# Patient Record
Sex: FEMALE | Race: ASIAN | NOT HISPANIC OR LATINO
[De-identification: names, ages, dates, MRNs, and addresses within clinical notes are randomized per-mention and may not be internally consistent; named-entity substitution may affect disease eponyms.]

---

## 2017-07-08 ENCOUNTER — RX RENEWAL (OUTPATIENT)
Age: 72
End: 2017-07-08

## 2021-03-18 ENCOUNTER — EMERGENCY (EMERGENCY)
Facility: HOSPITAL | Age: 76
LOS: 1 days | Discharge: HOME | End: 2021-03-18
Attending: EMERGENCY MEDICINE | Admitting: EMERGENCY MEDICINE
Payer: MEDICARE

## 2021-03-18 VITALS
HEART RATE: 62 BPM | SYSTOLIC BLOOD PRESSURE: 184 MMHG | TEMPERATURE: 98 F | OXYGEN SATURATION: 100 % | DIASTOLIC BLOOD PRESSURE: 53 MMHG | RESPIRATION RATE: 16 BRPM

## 2021-03-18 VITALS
RESPIRATION RATE: 18 BRPM | TEMPERATURE: 98 F | SYSTOLIC BLOOD PRESSURE: 126 MMHG | OXYGEN SATURATION: 100 % | HEART RATE: 61 BPM | DIASTOLIC BLOOD PRESSURE: 95 MMHG

## 2021-03-18 PROCEDURE — 71045 X-RAY EXAM CHEST 1 VIEW: CPT | Mod: 26

## 2021-03-18 PROCEDURE — 73060 X-RAY EXAM OF HUMERUS: CPT | Mod: 26,RT

## 2021-03-18 PROCEDURE — 73090 X-RAY EXAM OF FOREARM: CPT | Mod: 26,RT

## 2021-03-18 PROCEDURE — 70486 CT MAXILLOFACIAL W/O DYE: CPT | Mod: 26

## 2021-03-18 PROCEDURE — 93010 ELECTROCARDIOGRAM REPORT: CPT

## 2021-03-18 PROCEDURE — 73110 X-RAY EXAM OF WRIST: CPT | Mod: 26,RT

## 2021-03-18 PROCEDURE — 73080 X-RAY EXAM OF ELBOW: CPT | Mod: 26,RT

## 2021-03-18 PROCEDURE — 70450 CT HEAD/BRAIN W/O DYE: CPT | Mod: 26

## 2021-03-18 PROCEDURE — 99285 EMERGENCY DEPT VISIT HI MDM: CPT | Mod: 25

## 2021-03-18 PROCEDURE — 73030 X-RAY EXAM OF SHOULDER: CPT | Mod: 26,RT

## 2021-03-18 RX ORDER — LIDOCAINE 4 G/100G
1 CREAM TOPICAL ONCE
Refills: 0 | Status: COMPLETED | OUTPATIENT
Start: 2021-03-18 | End: 2021-03-18

## 2021-03-18 RX ORDER — TETANUS TOXOID, REDUCED DIPHTHERIA TOXOID AND ACELLULAR PERTUSSIS VACCINE, ADSORBED 5; 2.5; 8; 8; 2.5 [IU]/.5ML; [IU]/.5ML; UG/.5ML; UG/.5ML; UG/.5ML
0.5 SUSPENSION INTRAMUSCULAR ONCE
Refills: 0 | Status: COMPLETED | OUTPATIENT
Start: 2021-03-18 | End: 2021-03-18

## 2021-03-18 RX ORDER — ACETAMINOPHEN 500 MG
975 TABLET ORAL ONCE
Refills: 0 | Status: COMPLETED | OUTPATIENT
Start: 2021-03-18 | End: 2021-03-18

## 2021-03-18 RX ADMIN — TETANUS TOXOID, REDUCED DIPHTHERIA TOXOID AND ACELLULAR PERTUSSIS VACCINE, ADSORBED 0.5 MILLILITER(S): 5; 2.5; 8; 8; 2.5 SUSPENSION INTRAMUSCULAR at 12:05

## 2021-03-18 RX ADMIN — LIDOCAINE 1 PATCH: 4 CREAM TOPICAL at 12:23

## 2021-03-18 RX ADMIN — Medication 975 MILLIGRAM(S): at 12:23

## 2021-03-18 NOTE — ED PROVIDER NOTE - LOWER EXTREMITY EXAM, RIGHT
+tenderness at anterior aspect of bilateral knees; no redness; no warmth; no e/o joint laxity; no crepitus; soft compartments; neurovascular intact

## 2021-03-18 NOTE — ED PROVIDER NOTE - NSFOLLOWUPINSTRUCTIONS_ED_ALL_ED_FT
Advance activity as tolerated.  Continue all previously prescribed medications as directed unless otherwise instructed.  Take Tylenol 650mg (Two 325 mg pills) every 4-6 hours as needed for pain or fevers.  Eat a soft diet.  Irrigate lip wound after each meal.  Follow up with your primary care physician and orthopedics (referral list provided or call [phone] to make an appointment with the orthopedics clinic) in 48-72 hours- bring copies of your results.  Return to the ER for worsening or persistent symptoms, including but not limited to worsening/persistent lip pain/swelling/discharge/bleeding, headache, numbness, weakness, changes in vision/hearing, lightheadedness, passing out, falls, difficulty walking, and/or ANY NEW OR CONCERNING SYMPTOMS. If you have issues obtaining follow up, please call: [phone] to obtain a doctor or specialist who takes your insurance in your area.  You may call [phone] to make an appointment with the internal medicine clinic.

## 2021-03-18 NOTE — ED PROVIDER NOTE - CARE PLAN
Principal Discharge DX:	Fall, initial encounter  Secondary Diagnosis:	Stage 5 chronic kidney disease on chronic dialysis

## 2021-03-18 NOTE — ED PROVIDER NOTE - UPPER EXTREMITY EXAM, RIGHT
FROM at joints of RUE; no point tenderness; + generalized tenderness; no swelling; no redness; no warmth; no crepitus; neurovascular intact; soft compartments

## 2021-03-18 NOTE — ED PROVIDER NOTE - PROGRESS NOTE DETAILS
JOSE MARIA ACUNA:  Pt reports feeling better.  CT negative for acute pathology.  Xrays with following impression "No dislocations or acute appearing fractures in the above imaged anatomic regions.    [place] globular soft tissue calcification is in right subacromial space adjacent to humeral head and greater tuberosity margins compatible with foci of calcific tendinosis.    AC and 1st CMC joint arthrosis. Preserved remaining visualized joint spaces and no joint margin erosions. Spinal degenerative change apparent. Generalized osteopenia otherwise no discrete lytic or blastic lesions. Subcentimeter nodular calcified granuloma noted in right upper lung."  Pt notified of findings.  Pt instructed to follow up with ortho and to follow up with PMD to continue monitoring right upper lung granuloma.  Pt medically stable for discharge.  Strict return precautions given.  Pt to follow up with PMD, ortho.  SW to set up transportation to dialysis at this time.  Reassessment performed and plan for discharge discussed with Dr. Carrington who agrees with disposition and discharge plan.

## 2021-03-18 NOTE — ED ADULT TRIAGE NOTE - CHIEF COMPLAINT QUOTE
Patient brought in by EMS s/p mechanical fall walking into dialysis center. Fistula to left arm; did not received dialysis tx today. Complaining of pain to bilateral knees and right shoulder. Denies head injury. No anticoagulant use. States she hit mouth. No blood noted in mouth. History of diabetes.

## 2021-03-18 NOTE — ED PROVIDER NOTE - PATIENT PORTAL LINK FT
You can access the FollowMyHealth Patient Portal offered by [facility] by registering at the following website: [email or web address]. By joining [facility]’s FollowMyHealth portal, you will also be able to view your health information using other applications (apps) compatible with our system.

## 2021-03-18 NOTE — ED ADULT NURSE NOTE - OBJECTIVE STATEMENT
74 yo female, hx of CKD (fistula to L arm, dialysis tues, thur, d\sat- last session Tues), DM, HTN, c/o pain to R shoulder, martha knees, abrasion to palm right hand, laceration to inner, upper lip s/p mechanical fall outside of dialysis center this morning. pt with full ROM to R arm, no active bleeding to mouth. pt denies hitting head, LOC, anticoagulation use. upon assessment, pt breathing even, unlabored. abdomen soft, non tender. no teeth lose or misplaced. pt with equal strength to martha upper/lower extremities. JOSE MARIA Pickett at bedside for eval. pt denies headache, vision changes, difficulty swallowing, chest pain, SOB, n/v, dizziness.

## 2021-03-18 NOTE — ED ADULT NURSE NOTE - NS ED NURSE RECORD ANOTHER HT AND WT
Anesthesia Volume In Cc: 0.5
Biopsy Method: Dermablade
Consent: Written consent was obtained and risks were reviewed including but not limited to scarring, infection, bleeding, scabbing, incomplete removal, nerve damage and allergy to anesthesia.
Type Of Destruction Used: Curettage
Hide Anesthesia Volume?: No
Biopsy Type: H and E
Dressing: bandage
Detail Level: Detailed
Wound Care: Petrolatum
Anesthesia Type: 1% lidocaine with epinephrine
Silver Nitrate Text: The wound bed was treated with silver nitrate after the biopsy was performed.
Billing Type: Third-Party Bill
Hemostasis: Drysol
Cryotherapy Text: The wound bed was treated with cryotherapy after the biopsy was performed.
Electrodesiccation Text: The wound bed was treated with electrodesiccation after the biopsy was performed.
Notification Instructions: Patient will be notified of biopsy results. However, patient instructed to call the office if not contacted within 2 weeks.
Depth Of Biopsy: dermis
Curettage Text: The wound bed was treated with curettage after the biopsy was performed.
Size Of Lesion In Cm: 0
No
Was A Bandage Applied: Yes
Post-Care Instructions: I reviewed with the patient in detail post-care instructions. Patient is to keep the biopsy site dry overnight, and then apply bacitracin twice daily until healed. Patient may apply hydrogen peroxide soaks to remove any crusting.
Electrodesiccation And Curettage Text: The wound bed was treated with electrodesiccation and curettage after the biopsy was performed.

## 2021-03-18 NOTE — ED PROVIDER NOTE - OBJECTIVE STATEMENT
Pt is a 76 y/o F PMHx HTN, HLD, CKD (on dialysis Tues, Thurs and Sat; last dialysis was 3/16/21) p/w fall today.  Pt states she tripped on pavement, causing her to land onto bilateral knees and hands as well as striking face on pavement.  Pt reports striking upper lip on pavement, causing inner upper lip wound with oozing bleeding.  Pt reports moderate pain at anterior aspect of bilateral knees as well as moderate pain at right shoulder, right upper arm, right elbow and right forearm.  She reports pain worsens with movement of affected extremities.  She does not recall whether or not her tetanus shot is up to date.  Pt denies any fevers, chills, nausea, vomiting, headache, neck pain, back pain, chest pain, hip pain, numbness, weakness, changes in vision/hearing, incontinence, dizziness, lightheadedness, use of blood thinners, or any other specific complaints.

## 2021-03-18 NOTE — ED PROVIDER NOTE - CLINICAL SUMMARY MEDICAL DECISION MAKING FREE TEXT BOX
Pt is a 76 y/o F PMHx HTN, HLD, CKD (on dialysis Tues, Thurs and Sat; last dialysis was 3/16/21) p/w fall today -- superficial lip laceration; likely musculoskeletal pain, r/o fracture; r/o ICH, does not meet nexus criteria for c spine imaging -- ct head, ct maxillofacial, xray right shoulder/humerus/elbow/forearm/wrist, xray bilateral knees Pt is a 76 y/o F PMHx HTN, HLD, CKD (on dialysis Tues, Thurs and Sat; last dialysis was 3/16/21) p/w fall today -- superficial lip laceration; likely musculoskeletal pain, r/o fracture; r/o ICH, does not meet nexus criteria for c spine imaging -- ct head, ct maxillofacial, xray right shoulder/humerus/elbow/forearm/wrist, xray bilateral knees    haughey: pt on her way to dialysis who tripped and fell hurting her face, small abrasion right hand, has area of superficial lip lac/ abrasion, not enough for sutures, als with right shoulder arm pain.  will film.  can range shoulder well, snuff box neg for tenderenss

## 2021-03-18 NOTE — ED PROVIDER NOTE - ATTENDING CONTRIBUTION TO CARE
martha: pt on her way to dialysis who tripped and fell hurting her face, small abrasion right hand, has area of superficial lip lac/ abrasion, not enough for sutures, als with right shoulder arm pain.  will film.  can range shoulder well, snuff box neg for tenderenss    I performed a history and physical exam of the patient and discussed their management with the resident and /or advanced care provider. I reviewed the resident and /or ACP's note and agree with the documented findings and plan of care. My medical decison making and observations are found above.

## 2021-03-18 NOTE — ED ADULT NURSE NOTE - NSIMPLEMENTINTERV_GEN_ALL_ED
Implemented All Fall Risk Interventions:  [place] to call system. Call bell, personal items and telephone within reach. Instruct patient to call for assistance. Room bathroom lighting operational. Non-slip footwear when patient is off stretcher. Physically safe environment: no spills, clutter or unnecessary equipment. Stretcher in lowest position, wheels locked, appropriate side rails in place. Provide visual cue, wrist band, yellow gown, etc. Monitor gait and stability. Monitor for mental status changes and reorient to person, place, and time. Review medications for side effects contributing to fall risk. Reinforce activity limits and safety measures with patient and family.

## 2024-04-10 NOTE — ED ADULT NURSE NOTE - PAIN RATING/NUMBER SCALE (0-10): REST
4 76-year-old female with past medical history of A-fib on Eliquis, CHF with most recent EF 10-15%, PE and presenting to the ER with cough and shortness of breath.  Patient's daughter providing Welsh translation at bedside per patient request.  States approximately 3 weeks ago patient developed worsening of chronic cough associated with shortness of breath.  Patient was seen at outside hospital last week was prescribed prednisone and azithromycin as well as albuterol inhaler.  Patient continues to have shortness of breath with coughing.  On exam pt is well appearing, not tachypneic, O2 sat 95% on room air, b/l wheeze with decreased breath sounds in lower lobes. Concern for cardiac wheeze, pt with no known hx of asthma. Plan: cbc/cmp, trop, bnp, CXR, cardiac monitor. pt will require admission 76-year-old female with past medical history of A-fib on Eliquis, CHF with most recent EF 10-15%, PPM, LV thrombus  and presenting to the ER with cough and shortness of breath.  Patient's daughter providing Andorran translation at bedside per patient request.  States approximately 3 weeks ago patient developed worsening of chronic cough associated with shortness of breath.  Patient was seen at outside hospital last week was prescribed prednisone and azithromycin as well as albuterol inhaler.  Patient continues to have shortness of breath with coughing.  On exam pt is well appearing, not tachypneic, O2 sat 95% on room air, b/l wheeze with decreased breath sounds in lower lobes. Concern for cardiac wheeze, pt with no known hx of asthma. Plan: cbc/cmp, trop, bnp, CXR, cardiac monitor. pt will require admission
